# Patient Record
Sex: FEMALE | Race: AMERICAN INDIAN OR ALASKA NATIVE
[De-identification: names, ages, dates, MRNs, and addresses within clinical notes are randomized per-mention and may not be internally consistent; named-entity substitution may affect disease eponyms.]

---

## 2017-07-26 ENCOUNTER — HOSPITAL ENCOUNTER (EMERGENCY)
Dept: HOSPITAL 31 - C.ER | Age: 27
Discharge: HOME | End: 2017-07-26
Payer: MEDICAID

## 2017-07-26 VITALS — HEART RATE: 88 BPM | SYSTOLIC BLOOD PRESSURE: 128 MMHG | DIASTOLIC BLOOD PRESSURE: 76 MMHG

## 2017-07-26 VITALS — TEMPERATURE: 98.4 F | RESPIRATION RATE: 18 BRPM | OXYGEN SATURATION: 100 %

## 2017-07-26 DIAGNOSIS — M79.605: Primary | ICD-10-CM

## 2017-07-26 NOTE — C.PDOC
History Of Present Illness


26 yr old female presents to the ER with complaints of left leg pain for "months

". Patient states the pain is to her posterior upper leg. Patient denies seeing 

her PMD. Denies trauma, injury, back pain, foot pain, weakness or numbness. 


Time Seen by Provider: 07/26/17 10:12


Chief Complaint (Nursing): Lower Extremity Problem/Injury


History Per: Patient


History/Exam Limitations: no limitations


Onset/Duration Of Symptoms: Persistent ("Months")





Past Medical History


Reviewed: Historical Data, Nursing Documentation, Vital Signs


Vital Signs: 


 Last Vital Signs











Temp  98.4 F   07/26/17 10:05


 


Pulse  93 H  07/26/17 10:05


 


Resp  18   07/26/17 10:05


 


BP  134/77   07/26/17 10:05


 


Pulse Ox  100   07/26/17 11:10














- [facility] Procedures








REMOV INTRALUM EAR FB (11/22/13)








Family History: States: No Known Family Hx





- Social History


Hx Tobacco Use: No


Hx Alcohol Use: No


Hx Substance Use: No





- Immunization History


Hx Tetanus Toxoid Vaccination: No


Hx Influenza Vaccination: No


Hx Pneumococcal Vaccination: No





Review Of Systems


Except As Marked, All Systems Reviewed And Found Negative.


Musculoskeletal: Positive for: Leg Pain (Left ).  Negative for: Back Pain, Foot 

Pain


Neurological: Negative for: Weakness, Numbness





Physical Exam





- Physical Exam


Appears: Non-toxic, No Acute Distress


Skin: Warm, Dry, No Rash


Head: Atraumatic, Normacephalic


Oral Mucosa: Moist


Extremity: Normal ROM, No Tenderness, No Calf Tenderness, Capillary Refill (<2)

, No Swelling


Pulses: Left Dorsalis Pedis: Normal, Right Dorsalis Pedis: Normal


Neurological/Psych: Oriented x3, Normal Speech, Normal Motor





ED Course And Treatment


O2 Sat by Pulse Oximetry: 100 (RA )


Pulse Ox Interpretation: Normal





Medical Decision Making


Medical Decision Making: 


suspected msk pain vs sciatica, r/o dvt.





PLAN:


* Venous Duplex 


* Naproxen PO 


* 


* 


1100: pt well appearing in nad. dvt study neg. advise outptf/u and return 

precautions





Disposition





- Disposition


Disposition: HOME/ ROUTINE


Disposition Time: 11:03


Condition: STABLE


Additional Instructions: 


please follow up with your doctor. return to er with worsening symptoms or 

concerns. 


Prescriptions: 


Naproxen [Naprosyn] 500 mg PO BID PRN #14 tab


 PRN Reason: Pain, Mild (1-3)


Instructions:  Leg Pain (ED)


Forms:  [facility] (English)





- Clinical Impression


Clinical Impression: 


 Leg pain








- Scribe Statement


The provider has reviewed the documentation as recorded by the Christieibe


Viridiana Broderick


Provider Attestation: 


All medical record entries made by the Christieibhardik were at my direction and 

personally dictated by me. I have reviewed the chart and agree that the record 

accurately reflects my personal performance of the history, physical exam, 

medical decision making, and the department course for this patient. I have 

also personally directed, reviewed, and agree with the discharge instructions 

and disposition.

## 2017-07-26 NOTE — VASCLAB
PROCEDURE:  Left Lower Extremity Venous Duplex Exam. 



HISTORY:

leg pain r/o dvt 



PRIORS:

None. 



TECHNIQUE:

Left common femoral, femoral, popliteal and posterior tibial, 

peroneal and great saphenous veins were evaluated. Flow was assessed 

with color Doppler, compressibility, assessment of phasic flow and 

augmentation response.



Report prepared by   SAPPHIRE Diaz, RVT



FINDINGS:



LEFT:

1. Common Femoral Vein:



1.1.  Compressibility - Fully compressible: Thrombus - None : Flow - 

Phasic: Augmentation -Normal: Reflux - None.



2. Femoral Vein: 



2.1. Compressibility - Fully compressible: Thrombus -  None: Flow - 

Phasic: Augmentation -Normal: Reflux - None.



3. Popliteal Vein: 



3.1. Compressibility - Fully compressible: Thrombus -  None: Flow - 

Phasic: Augmentation -Normal: Reflux - None.



4. Posterior Tibial Vein: 



4.1. Compressibility - Fully compressible: Thrombus -  None: Flow - 

Phasic: Augmentation -Normal: Reflux - None.



5. Peroneal Vein: 



5.1. Compressibility - Fully compressible: Thrombus -  None: Flow - 

Phasic: Augmentation -Normal: Reflux - None.



6. Great Saphenous Vein:

6.1.  Compressibility - Fully compressible: Thrombus - None: Flow - 

Phasic: Augmentation - Normal: Reflux - None.





OTHER FINDINGS:  



IMPRESSION:

No evidence of deep or superficial vein thrombosis of the left lower 

extremity with excellent venous flow. Normal valve function noted of 

the left side.     



Normal venous flow noted in the right common femoral vein.

## 2018-01-16 ENCOUNTER — HOSPITAL ENCOUNTER (EMERGENCY)
Dept: HOSPITAL 31 - C.ER | Age: 28
LOS: 1 days | Discharge: HOME | End: 2018-01-17
Payer: MEDICAID

## 2018-01-16 VITALS — RESPIRATION RATE: 18 BRPM

## 2018-01-16 DIAGNOSIS — R10.9: Primary | ICD-10-CM

## 2018-01-16 LAB
ALBUMIN SERPL-MCNC: 4.1 G/DL (ref 3.5–5)
ALBUMIN/GLOB SERPL: 1.2 {RATIO} (ref 1–2.1)
ALT SERPL-CCNC: 30 U/L (ref 9–52)
AST SERPL-CCNC: 21 U/L (ref 14–36)
BASOPHILS # BLD AUTO: 0.1 K/UL (ref 0–0.2)
BASOPHILS NFR BLD: 0.8 % (ref 0–2)
BILIRUB UR-MCNC: NEGATIVE MG/DL
BUN SERPL-MCNC: 12 MG/DL (ref 7–17)
CALCIUM SERPL-MCNC: 8.7 MG/DL (ref 8.6–10.4)
EOSINOPHIL # BLD AUTO: 0.1 K/UL (ref 0–0.7)
EOSINOPHIL NFR BLD: 1.5 % (ref 0–4)
ERYTHROCYTE [DISTWIDTH] IN BLOOD BY AUTOMATED COUNT: 13.8 % (ref 11.5–14.5)
GFR NON-AFRICAN AMERICAN: > 60
GLUCOSE UR STRIP-MCNC: NORMAL MG/DL
HCG,QUALITATIVE URINE: NEGATIVE
HGB BLD-MCNC: 12.2 G/DL (ref 11–16)
LEUKOCYTE ESTERASE UR-ACNC: (no result) LEU/UL
LIPASE: 226 U/L (ref 23–300)
LYMPHOCYTES # BLD AUTO: 3.4 K/UL (ref 1–4.3)
LYMPHOCYTES NFR BLD AUTO: 42.7 % (ref 20–40)
MCH RBC QN AUTO: 24.9 PG (ref 27–31)
MCHC RBC AUTO-ENTMCNC: 32.7 G/DL (ref 33–37)
MCV RBC AUTO: 76.2 FL (ref 81–99)
MONOCYTES # BLD: 0.5 K/UL (ref 0–0.8)
MONOCYTES NFR BLD: 6 % (ref 0–10)
NEUTROPHILS # BLD: 3.9 K/UL (ref 1.8–7)
NEUTROPHILS NFR BLD AUTO: 49 % (ref 50–75)
NRBC BLD AUTO-RTO: 0.2 % (ref 0–2)
PH UR STRIP: 7 [PH] (ref 5–8)
PLATELET # BLD: 263 K/UL (ref 130–400)
PMV BLD AUTO: 8.3 FL (ref 7.2–11.7)
PROT UR STRIP-MCNC: NEGATIVE MG/DL
RBC # BLD AUTO: 4.88 MIL/UL (ref 3.8–5.2)
RBC # UR STRIP: (no result) /UL
SP GR UR STRIP: 1.01 (ref 1–1.03)
SQUAMOUS EPITHIAL: < 1 /HPF (ref 0–5)
URINE NITRATE: NEGATIVE
UROBILINOGEN UR-MCNC: NORMAL MG/DL (ref 0.2–1)
WBC # BLD AUTO: 8 K/UL (ref 4.8–10.8)

## 2018-01-16 PROCEDURE — 96374 THER/PROPH/DIAG INJ IV PUSH: CPT

## 2018-01-16 PROCEDURE — 74176 CT ABD & PELVIS W/O CONTRAST: CPT

## 2018-01-16 PROCEDURE — 80053 COMPREHEN METABOLIC PANEL: CPT

## 2018-01-16 PROCEDURE — 84703 CHORIONIC GONADOTROPIN ASSAY: CPT

## 2018-01-16 PROCEDURE — 83690 ASSAY OF LIPASE: CPT

## 2018-01-16 PROCEDURE — 81001 URINALYSIS AUTO W/SCOPE: CPT

## 2018-01-16 PROCEDURE — 99285 EMERGENCY DEPT VISIT HI MDM: CPT

## 2018-01-16 PROCEDURE — 96375 TX/PRO/DX INJ NEW DRUG ADDON: CPT

## 2018-01-16 PROCEDURE — 85025 COMPLETE CBC W/AUTO DIFF WBC: CPT

## 2018-01-16 NOTE — C.PDOC
History Of Present Illness


27 year old female presents to the ED c/o headache and also some abdominal pain 

that she describes more as discomfort. Patient reports she ate dinner that 

consisted of chicken and sweet potato and soon after the symptoms started. 

Patient denies fever, chills, nausea, vomit, diarrhea, vaginal bleeding, 

vaginal discharge, dysuria, hematuria. 


Time Seen by Provider: 01/16/18 20:16


Chief Complaint (Nursing): Abdominal Pain


History Per: Patient


History/Exam Limitations: no limitations


Onset/Duration Of Symptoms: Hrs


Current Symptoms Are (Timing): Still Present


Quality: "Pain"


Preceeding Symptoms: None


Recent travel outside of the [place]: No


Additional History Per: Patient





Past Medical History


Reviewed: Historical Data, Nursing Documentation, Vital Signs


Vital Signs: 


 Last Vital Signs











Temp  98 F   01/17/18 00:36


 


Pulse  82   01/17/18 00:36


 


Resp  18   01/17/18 00:36


 


BP  128/75   01/17/18 00:36


 


Pulse Ox  99   01/17/18 00:36














- Medical History


PMH: No Chronic Diseases


Surgical History: No Surg Hx





- [facility] Procedures








REMOV INTRALUM EAR FB (11/22/13)








Family History: States: Unknown Family Hx





- Social History


Hx Tobacco Use: No


Hx Alcohol Use: No


Hx Substance Use: No





- Immunization History


Hx Tetanus Toxoid Vaccination: No


Hx Influenza Vaccination: No


Hx Pneumococcal Vaccination: No





Review Of Systems


Constitutional: Negative for: Fever, Chills


Cardiovascular: Negative for: Chest Pain, Palpitations


Respiratory: Negative for: Cough, Shortness of Breath


Gastrointestinal: Positive for: Abdominal Pain.  Negative for: Nausea, Vomiting


Genitourinary: Negative for: Dysuria, Frequency, Hematuria


Skin: Negative for: Rash


Neurological: Positive for: Headache.  Negative for: Weakness, Numbness





Physical Exam





- Physical Exam


Appears: Non-toxic, No Acute Distress


Skin: Normal Color, Warm, Dry


Head: Atraumatic, Normacephalic


Eye(s): bilateral: Normal Inspection


Nose: No Discharge, No Deformity


Oral Mucosa: Moist


Neck: Normal ROM, Supple


Chest: Symmetrical


Cardiovascular: Rhythm Regular, No Murmur


Respiratory: Normal Breath Sounds, No Rales, No Rhonchi, No Wheezing


Gastrointestinal/Abdominal: Soft, Tenderness ((+) diffuse tenderness,(+) bs, no 

guarding, no rebound ), No Guarding, No Rebound


Extremity: Normal ROM, No Pedal Edema, No Calf Tenderness, No Deformity, No 

Swelling


Neurological/Psych: Oriented x3, Normal Speech, Normal Cognition


Gait: Steady





ED Course And Treatment





- Laboratory Results


Result Diagrams: 


 01/16/18 21:30





 01/16/18 21:30


O2 Sat by Pulse Oximetry: 100 (On RA)


Pulse Ox Interpretation: Normal





Medical Decision Making


Medical Decision Making: 


Impression : abdominal pain


Plan:


* Labs


* Pepcid 20 mg IVP


* IV fluids


* Toradol 30 mg IVP


* UA





Progress: 


On reassessment, patient reports ongoing abdominal pain. CT Abdomen/Pelvis 

ordered. 


On reassessment, patient is resting comfortably, showing no signs of distress 

and reports an improvement in her symptoms. CT Abd/Pelvis results are negative. 

Patient will be provided with a note for work, as requested. She is advised to 

follow up with her PMD within 1-2 days for further evaluation and/or return to 

the ED if symptoms persist or worsen. 





Disposition


Counseled Patient/Family Regarding: Studies Performed, Diagnosis, Need For 

Followup, Rx Given





- Disposition


Disposition: HOME/ ROUTINE


Disposition Time: 00:21


Condition: IMPROVED


Additional Instructions: 


follow up with your doctor in 2 days


call to make an appointment


take medications as needed


return to ER if symptoms worsens or progress 


Prescriptions: 


Famotidine [Pepcid] 20 mg PO BID #20 tab


Naproxen [Naprosyn] 500 mg PO BID PRN #10 tab


 PRN Reason: Pain, Moderate (4-7)


Ondansetron ODT [Zofran ODT] 4 mg PO TID PRN #12 odt


 PRN Reason: Nausea/Vomiting


Instructions:  Gas and Bloating (ED), Abdominal Pain (ED)


Forms:  [facility] (English), Work Excuse





- Clinical Impression


Clinical Impression: 


 Abdominal pain








- Scribe Statement


The provider has reviewed the documentation as recorded by the Scribe





Chaz Granda





All medical record entries made by the Scribe were at my direction and 

personally dictated by me. I have reviewed the chart and agree that the record 

accurately reflects my personal performance of the history, physical exam, 

medical decision making, and the department course for this patient. I have 

also personally directed, reviewed, and agree with the discharge instructions 

and disposition.

## 2018-01-17 VITALS — TEMPERATURE: 98 F | DIASTOLIC BLOOD PRESSURE: 75 MMHG | SYSTOLIC BLOOD PRESSURE: 128 MMHG | HEART RATE: 82 BPM

## 2018-01-17 VITALS — OXYGEN SATURATION: 100 %

## 2018-01-17 NOTE — CT
EXAM:

  CT Abdomen and Pelvis Without Intravenous Contrast



CLINICAL HISTORY:

  27 years old, female; Pain; Abdominal pain; Additional info: Abd. Pain



TECHNIQUE:

  Axial computed tomography images of the abdomen and pelvis without 

intravenous contrast.  All CT scans at this facility use one or more dose 

reduction techniques, viz.: automated exposure control; ma/kV adjustment per 

patient size (including targeted exams where dose is matched to indication; 

i.e. head); or iterative reconstruction technique.

  Coronal and sagittal reformatted images were created and reviewed.



COMPARISON:

  No relevant prior studies available.



FINDINGS:

  Limitations:  Lack of intravenous contrast.

  Lower thorax: No acute findings.



 ABDOMEN:

  Liver:  Unremarkable.

  Gallbladder and bile ducts:  No calcified stones.  No ductal dilation.

  Pancreas:  Unremarkable.  No ductal dilation.

  Spleen:  No splenomegaly.

  Adrenals:  No mass.

  Kidneys and ureters:  No renal calculi.  No hydronephrosis.

  Stomach and bowel:  No definite mural thickening.  No obstruction.

  Appendix:  Normal caliber.  No definite inflammation.



 PELVIS:

  Bladder:  Unremarkable.  No stones.

  Reproductive:  Unremarkable as visualized.



 ABDOMEN and PELVIS:

  Intraperitoneal space:  No significant fluid collection.  No free air.

  Bones/joints:  No acute fracture.

  Soft tissues:  Unremarkable.

  Vasculature:  Unremarkable.  No aneurysm.

  Lymph nodes:  No pathologically enlarged lymph nodes.



IMPRESSION:     

1.  No definite acute intraabdominal abnormality.

## 2018-12-30 ENCOUNTER — HOSPITAL ENCOUNTER (EMERGENCY)
Dept: HOSPITAL 42 - ED | Age: 28
Discharge: HOME | End: 2018-12-30
Payer: MEDICAID

## 2018-12-30 VITALS — HEART RATE: 77 BPM | OXYGEN SATURATION: 99 % | DIASTOLIC BLOOD PRESSURE: 72 MMHG | SYSTOLIC BLOOD PRESSURE: 118 MMHG

## 2018-12-30 VITALS — TEMPERATURE: 98.7 F | RESPIRATION RATE: 18 BRPM

## 2018-12-30 DIAGNOSIS — R10.31: ICD-10-CM

## 2018-12-30 DIAGNOSIS — B37.3: Primary | ICD-10-CM

## 2018-12-30 LAB
ALBUMIN SERPL-MCNC: 4.6 G/DL (ref 3–4.8)
ALBUMIN/GLOB SERPL: 1.3 {RATIO} (ref 1.1–1.8)
ALT SERPL-CCNC: 25 U/L (ref 7–56)
AMYLASE SERPL-CCNC: 108 U/L (ref 35–125)
APPEARANCE UR: CLEAR
APTT BLD: 30.2 SECONDS (ref 25.1–36.5)
AST SERPL-CCNC: 27 U/L (ref 14–36)
BACTERIA #/AREA URNS HPF: (no result) /HPF
BASOPHILS # BLD AUTO: 0.03 K/MM3 (ref 0–2)
BASOPHILS NFR BLD: 0.4 % (ref 0–3)
BILIRUB UR-MCNC: NEGATIVE MG/DL
BUN SERPL-MCNC: 12 MG/DL (ref 7–21)
CALCIUM SERPL-MCNC: 9.5 MG/DL (ref 8.4–10.5)
COLOR UR: YELLOW
EOSINOPHIL # BLD: 0.1 10*3/UL (ref 0–0.7)
EOSINOPHIL NFR BLD: 1.5 % (ref 1.5–5)
ERYTHROCYTE [DISTWIDTH] IN BLOOD BY AUTOMATED COUNT: 14.2 % (ref 11.5–14.5)
GFR NON-AFRICAN AMERICAN: > 60
GLUCOSE UR STRIP-MCNC: NEGATIVE MG/DL
GRANULOCYTES # BLD: 3.95 10*3/UL (ref 1.4–6.5)
GRANULOCYTES NFR BLD: 58.7 % (ref 50–68)
HGB BLD-MCNC: 13 G/DL (ref 12–16)
INR PPP: 1.09
LEUKOCYTE ESTERASE UR-ACNC: NEGATIVE LEU/UL
LIPASE SERPL-CCNC: 178 U/L (ref 23–300)
LYMPHOCYTES # BLD: 2.4 10*3/UL (ref 1.2–3.4)
LYMPHOCYTES NFR BLD AUTO: 35.2 % (ref 22–35)
MCH RBC QN AUTO: 25.1 PG (ref 25–35)
MCHC RBC AUTO-ENTMCNC: 32.8 G/DL (ref 31–37)
MCV RBC AUTO: 76.4 FL (ref 80–105)
MONOCYTES # BLD AUTO: 0.3 10*3/UL (ref 0.1–0.6)
MONOCYTES NFR BLD: 4.2 % (ref 1–6)
PH UR STRIP: 6 [PH] (ref 4.7–8)
PLATELET # BLD: 258 10^3/UL (ref 120–450)
PMV BLD AUTO: 9.6 FL (ref 7–11)
PROT UR STRIP-MCNC: NEGATIVE MG/DL
PROTHROMBIN TIME: 12.4 SECONDS (ref 9.4–12.5)
RBC # BLD AUTO: 5.18 10^6/UL (ref 3.5–6.1)
RBC # UR STRIP: (no result) /UL
RBC #/AREA URNS HPF: (no result) /HPF (ref 0–2)
SP GR UR STRIP: >= 1.03 (ref 1–1.03)
UROBILINOGEN UR STRIP-ACNC: 0.2 E.U./DL
WBC # BLD AUTO: 6.7 10^3/UL (ref 4.5–11)

## 2018-12-30 PROCEDURE — 96374 THER/PROPH/DIAG INJ IV PUSH: CPT

## 2018-12-30 PROCEDURE — 82150 ASSAY OF AMYLASE: CPT

## 2018-12-30 PROCEDURE — 74177 CT ABD & PELVIS W/CONTRAST: CPT

## 2018-12-30 PROCEDURE — 85610 PROTHROMBIN TIME: CPT

## 2018-12-30 PROCEDURE — 96361 HYDRATE IV INFUSION ADD-ON: CPT

## 2018-12-30 PROCEDURE — 87086 URINE CULTURE/COLONY COUNT: CPT

## 2018-12-30 PROCEDURE — 81001 URINALYSIS AUTO W/SCOPE: CPT

## 2018-12-30 PROCEDURE — 76830 TRANSVAGINAL US NON-OB: CPT

## 2018-12-30 PROCEDURE — 96375 TX/PRO/DX INJ NEW DRUG ADDON: CPT

## 2018-12-30 PROCEDURE — 85025 COMPLETE CBC W/AUTO DIFF WBC: CPT

## 2018-12-30 PROCEDURE — 80053 COMPREHEN METABOLIC PANEL: CPT

## 2018-12-30 PROCEDURE — 85730 THROMBOPLASTIN TIME PARTIAL: CPT

## 2018-12-30 PROCEDURE — 83690 ASSAY OF LIPASE: CPT

## 2018-12-30 PROCEDURE — 99283 EMERGENCY DEPT VISIT LOW MDM: CPT

## 2018-12-30 NOTE — US
Date of service: 



12/30/2018



HISTORY:

RLQ pain, r/o torsion



COMPARISON:

None available.



TECHNIQUE:

Transvaginal pelvic ultrasound was performed.



FINDINGS:



UTERUS:

Measures 7.9 x 5.7 x 3.7 cm.  Retroflexed, normal in size and 

appearance. No fibroid or other mass lesion seen.



ENDOMETRIUM:

Measures 11 mm in diameter. Unremarkable. 



CERVIX:

No cervical abnormality identified.



RIGHT OVARY:

Measures 3.8 x 1.7 x 3.3 cm. No solid mass. Normal flow. 



LEFT OVARY:

Measures 3.2 x 2.7 x 3.3 cm. No solid mass. Normal flow. 



FREE FLUID:

There is a small amount of free fluid in the cul de sac, likely 

physiologic.



OTHER FINDINGS:

None. 



IMPRESSION:

Unremarkable pelvic ultrasound.  No evidence of ovarian cyst or 

adnexal mass.  No evidence for torsion.

## 2018-12-30 NOTE — CT
Date of service: 



12/30/2018



PROCEDURE:  CT Abdomen and Pelvis with contrast



HISTORY:

RLQ pain



COMPARISON:

None.



TECHNIQUE:

Contrast dose: 100 mL Omnipaque 350



Radiation dose:



Total exam DLP = 403.59 mGy-cm.



This CT exam was performed using one or more of the following dose 

reduction techniques: Automated exposure control, adjustment of the 

mA and/or kV according to patient size, and/or use of iterative 

reconstruction technique.



FINDINGS:



LOWER THORAX:

Unremarkable. 



LIVER:

Unremarkable. No gross lesion or ductal dilatation. 



GALLBLADDER AND BILE DUCTS:

Unremarkable. 



PANCREAS:

Unremarkable. No gross lesion or ductal dilatation.



SPLEEN:

Unremarkable. 



ADRENALS:

Unremarkable. No mass. 



KIDNEYS AND URETERS:

Unremarkable. No hydronephrosis. No solid mass. 



VASCULATURE:

Unremarkable. No aortic aneurysm. No aortic atherosclerotic 

calcification or mural plaque present.



BOWEL:

Unremarkable. No obstruction. No gross mural thickening. 



APPENDIX:

Normal appendix. 



PERITONEUM:

Unremarkable. No free fluid. No free air. 



LYMPH NODES:

Unremarkable. No enlarged lymph nodes. 



BLADDER:

Unremarkable. 



REPRODUCTIVE:

Slightly increased prominence of the right adnexa relative to the 

left 



BONES:

No acute fracture. 



OTHER FINDINGS:

None.



IMPRESSION:

No evidence of appendicitis.



Slightly increased prominence of the right adnexa relative to the 

left.  Given right lower quadrant pain, pelvic ultrasound can be 

considered to evaluate for ovarian torsion as clinically needed.

## 2018-12-30 NOTE — ED PDOC
Arrival/HPI





- General


Chief Complaint: GI Problem


Time Seen by Provider: 12/30/18 14:35


Historian: Patient





- History of Present Illness


Narrative History of Present Illness (Text): 





12/31/18 00:19


28 year old female with no significant past medical history presents to the 

emergency department complaining of right sided flank pain x one day. Patient 

describes gradually worsening intermittent sharp right lower quadrant pain that 

radiates around the right flank to the back. Tolerating PO and having BM per 

baseline. States she typically has pain like this with her UTIs. Unknown LMP, 

patient on DepoProvera shot. Denies fever, chills, urinary symptoms, stool 

changes, N/V, vaginal bleeding, vaginal discharge, pelvic pain, rash, or any 

other associated symptoms.





Past Medical History





- Provider Review


Nursing Documentation Reviewed: Yes





- Infectious Disease


Hx of Infectious Diseases: None





- Psychiatric


Hx Substance Use: No





- Anesthesia


Hx Anesthesia: No





- Suicidal Assessment


Feels Threatened In Home Enviroment: No





Family/Social History





- Physician Review


Nursing Documentation Reviewed: Yes


Family/Social History: No Known Family HX


Smoking Status: Never Smoked


Hx Alcohol Use: No


Hx Substance Use: No





Allergies/Home Meds


Allergies/Adverse Reactions: 


Allergies





No Known Allergies Allergy (Verified 12/30/18 14:33)


   








Home Medications: 


                                    Home Meds











 Medication  Instructions  Recorded  Confirmed


 


Depo-Provera Contraceptive 1 mg IM Q3M 10/08/15 12/30/18














Review of Systems





- Physician Review


All systems were reviewed & negative as marked: Yes





- Review of Systems


Constitutional: Normal.  absent: Fevers


Eyes: Normal


ENT: Normal.  absent: Sinus Congestion


Respiratory: Normal.  absent: SOB, Cough


Cardiovascular: Normal.  absent: Chest Pain, Palpitations, Syncope


Gastrointestinal: Abdominal Pain.  absent: Stool Changes, Nausea, Vomiting, 

Appetite Changes, Hematochezia, Hematemesis


Genitourinary Female: Normal.  absent: Dysuria, Frequency, Vaginal Bleeding, 

Vaginal Discharge


Musculoskeletal: Back Pain


Skin: Normal.  absent: Rash


Neurological: Normal.  absent: Headache, Dizziness, Gait Changes, Disequilibrium


Endocrine: Normal


Hemo/Lymphatic: Normal


Psychiatric: Normal





Physical Exam


Vital Signs Reviewed: Yes





Vital Signs











  Temp Pulse Resp BP Pulse Ox


 


 12/30/18 14:31  98.7 F  88  18  120/78  98











Temperature: Afebrile


Blood Pressure: Normal


Pulse: Regular


Respiratory Rate: Normal


Appearance: Positive for: Well-Appearing, Non-Toxic, Comfortable


Pain Distress: None


Mental Status: Positive for: Alert and Oriented X 3





- Systems Exam


Head: Present: Atraumatic, Normocephalic


Pupils: Present: PERRL


Extroacular Muscles: Present: EOMI


Conjunctiva: Present: Normal


Mouth: Present: Moist Mucous Membranes


Pharnyx: Present: Normal.  No: ERYTHEMA, EXUDATE


Nose (External): Present: Atraumatic


Nose (Internal): Present: Normal Inspection


Neck: Present: Normal Range of Motion.  No: Meningeal Signs


Respiratory/Chest: Present: Clear to Auscultation, Good Air Exchange.  No: 

Respiratory Distress, Accessory Muscle Use


Cardiovascular: Present: Regular Rate and Rhythm, Normal S1, S2, Peripheal 

Pulses Present.  No: Murmurs


Abdomen: Present: Tenderness (RLQ), Normal Bowel Sounds.  No: Distention, Annamaria

toneal Signs, Rebound, Guarding


Genitourinary/Pelvic Exam: Present: Normal External Genitalia, Vaginal Discharge

(thick white curd-like discharge suspicious of candidal vaginitis), Cervical os 

Closed.  No: Vaginal Bleeding, Vaginal Lesions, Adenexal Tenderness, Adenexal 

Mass, Cervical Motion Tendernes, Odor


Back: Present: Normal Inspection.  No: CVA Tenderness


Upper Extremity: Present: Normal Inspection, Normal ROM, NORMAL PULSES, 

Neurovascularly Intact, Capillary Refill < 2s.  No: Cyanosis, Edema


Lower Extremity: Present: Normal Inspection, NORMAL PULSES, Normal ROM, 

Neurovascularly Intact, Capillary Refill < 2 s.  No: Edema


Neurological: Present: GCS=15, CN II-XII Intact, Speech Normal, Motor Func 

Grossly Intact, Normal Sensory Function, Gait Normal


Skin: Present: Warm, Dry, Normal Color.  No: Rashes


Lymphatic: No: Cervical Adenopathy


Psychiatric: Present: Alert, Oriented x 3, Normal Insight, Normal Concentration,

Normal Affect, Normal Mood





Medical Decision Making


ED Course and Treatment: 


Initial Plan:


* CBC, CMP


* Lipase, Amylase


* Coags


* UA, culture


* CT Abd/Pelvis





Labwork significant for mild hypokalemia at 3.5, will give 20mEq KCl. Otherwise 

unremarkable


UA with trace blood





16:50


CT Abd/Pelvis shows prominent right ovary, recommends transvaginal ultrasound. 

Will get transvaginal ultrasound to rule out ovarian torsion.





18:00


Transvaginal ultrasound negative for ovarian torsion.


Patient still with mild abdominal discomfort.


Pelvic exam revealed discharge suspicious for candidal infection, will give 

Diflucan. 


Will treat for UTI with Keflex secondary to patient's typical symptoms and trace

blood in urine.





Diagnostic testing results and plan of care discussed with patient, and strict 

instructions given regarding prescriptions, importance of follow up, and signs 

to return to Emergency Department, to include worsening abdominal pain, N/V, 

fever, chills, or any other new/worsening symptoms. Patient verbalizes 

understanding of discussion.  Patient A&Ox3, ambulating with steady gait, stable

for discharge home.











- Lab Interpretations


Lab Results: 











                                 12/30/18 15:05 





                                 12/30/18 15:05 





                                   Lab Results





12/30/18 15:19: Urine Color Yellow, Urine Appearance Clear, Urine pH 6.0, Ur 

Specific Gravity >= 1.030, Urine Protein Negative, Urine Glucose (UA) Negative, 

Urine Ketones Negative, Urine Blood Trace-intact H, Urine Nitrate Negative, 

Urine Bilirubin Negative, Urine Urobilinogen 0.2, Ur Leukocyte Esterase 

Negative, Urine RBC 0 - 2, Urine WBC 1 - 3, Ur Epithelial Cells 3 - 4, Urine 

Bacteria Small


12/30/18 15:05: Sodium 143, Potassium 3.4 L, Chloride 109 H, Carbon Dioxide 25, 

Anion Gap 12, BUN 12, Creatinine 0.8, Est GFR (African Amer) > 60, Est GFR (Non-

Af Amer) > 60, Random Glucose 92, Calcium 9.5, Total Bilirubin 0.8, AST 27, ALT 

25, Alkaline Phosphatase 79, Total Protein 8.1, Albumin 4.6, Globulin 3.6, 

Albumin/Globulin Ratio 1.3, Amylase 108, Lipase 178


12/30/18 15:05: PT 12.4, INR 1.09, APTT 30.2


12/30/18 15:05: WBC 6.7, RBC 5.18, Hgb 13.0, Hct 39.6, MCV 76.4 L, MCH 25.1, 

MCHC 32.8, RDW 14.2, Plt Count 258, MPV 9.6, Gran % 58.7, Lymph % (Auto) 35.2 H,

Mono % (Auto) 4.2, Eos % (Auto) 1.5, Baso % (Auto) 0.4, Gran # 3.95, Lymph # 

(Auto) 2.4, Mono # (Auto) 0.3, Eos # (Auto) 0.1, Baso # (Auto) 0.03








I have reviewed the lab results: Yes


Interpretation: All labs normal





- RAD Interpretation


Narrative RAD Interpretations (Text): 





CT Abd/Pelvis with IV contrast:


FINDINGS:


LOWER THORAX:


Unremarkable. 


LIVER:


Unremarkable. No gross lesion or ductal dilatation. 


GALLBLADDER AND BILE DUCTS:


Unremarkable. 


PANCREAS:


Unremarkable. No gross lesion or ductal dilatation.


SPLEEN:


Unremarkable. 


ADRENALS:


Unremarkable. No mass. 


KIDNEYS AND URETERS:


Unremarkable. No hydronephrosis. No solid mass. 


VASCULATURE:


Unremarkable. No aortic aneurysm. No aortic atherosclerotic calcification or 

mural plaque present.


BOWEL:


Unremarkable. No obstruction. No gross mural thickening. 


APPENDIX:


Normal appendix. 


PERITONEUM:


Unremarkable. No free fluid. No free air. 


LYMPH NODES:


Unremarkable. No enlarged lymph nodes. 


BLADDER:


Unremarkable. 


REPRODUCTIVE:


Slightly increased prominence of the right adnexa relative to the left 


BONES:


No acute fracture. 


OTHER FINDINGS:


None.


IMPRESSION:


No evidence of appendicitis.


Slightly increased prominence of the right adnexa relative to the left.  Given 

right lower quadrant pain, pelvic ultrasound can be considered to evaluate for 

ovarian torsion as clinically needed.





12/30/18 17:55


Transvaginal Ultrasound:


FINDINGS:


UTERUS:


Measures 7.9 x 5.7 x 3.7 cm.  Retroflexed, normal in size and appearance. No 

fibroid or other mass lesion seen.


ENDOMETRIUM:


Measures 11 mm in diameter. Unremarkable. 


CERVIX:


No cervical abnormality identified.


RIGHT OVARY:


Measures 3.8 x 1.7 x 3.3 cm. No solid mass. Normal flow. 


LEFT OVARY:


Measures 3.2 x 2.7 x 3.3 cm. No solid mass. Normal flow. 


FREE FLUID:


There is a small amount of free fluid in the cul de sac, likely physiologic.


OTHER FINDINGS:


None. 


IMPRESSION:


Unremarkable pelvic ultrasound.  No evidence of ovarian cyst or adnexal mass.  

No evidence for torsion.





Radiology Orders: 











12/30/18 15:00


ABD & PELVIS IV CONTRAST ONLY [CT] Stat 





12/30/18 16:53


TRANSVAGINAL [US] Stat 











: Radiologist





- Medication Orders


Current Medication Orders: 














Discontinued Medications





Famotidine (Pepcid)  20 mg IVP STAT STA


   Stop: 12/30/18 14:55


   Last Admin: 12/30/18 15:39  Dose: 20 mg





IVP Administration


 Document     12/30/18 15:39  MOHINI  (Rec: 12/30/18 15:39  [id number])


     Charges for Administration


      # of IVP Administrations                   1





Sodium Chloride (Sodium Chloride 0.9%)  1,000 mls @ 999 mls/hr IV .Q1H1M STA


   Stop: 12/30/18 15:57


   Last Admin: 12/30/18 15:20  Dose: 999 mls/hr





eMAR Start Stop


 Document     12/30/18 15:20  MOHINI  (Rec: 12/30/18 15:38  [id number])


     Intravenous Solution


      Start Date                                 12/30/18


      Start Time                                 15:20


      End Date                                   12/30/18


      End time                                   16:20


      Total Infusion Time                        60





Ketorolac Tromethamine (Toradol)  30 mg IVP STAT STA


   Stop: 12/30/18 14:55


   Last Admin: 12/30/18 15:38  Dose: 30 mg





MAR Pain Assessment


 Document     12/30/18 15:38  MOHINI  (Rec: 12/30/18 15:38  [id number])


     Pain Reassessment


      Is this a pain reassessment?               No


     Sleep


      Is patient sleeping during reassessment?   No


     Presence of Pain


      Presence of Pain                           Yes


IVP Administration


 Document     12/30/18 15:38  KATELYN  (Rec: 12/30/18 15:38  [id number])


     Charges for Administration


      # of IVP Administrations                   1





Potassium Chloride (K-Dur 20 Meq Er Tab)  20 meq PO STAT STA


   Stop: 12/30/18 15:58


   Last Admin: 12/30/18 16:36  Dose: 20 meq











Disposition/Present on Arrival





- Present on Arrival


Any Indicators Present on Arrival: No


History of DVT/PE: No


History of Uncontrolled Diabetes: No


Urinary Catheter: No


History of Decub. Ulcer: No


History Surgical Site Infection Following: None





- Disposition


Have Diagnosis and Disposition been Completed?: Yes


Diagnosis: 


 Abdominal pain, Vaginal candidiasis





Disposition: HOME/ ROUTINE


Disposition Time: 18:25


Condition: IMPROVED


Discharge Instructions (ExitCare):  Acute Abdomen (Belly Pain), Adult (DC), 

Flank Pain (DC)


Additional Instructions: 


Increase fluids


Ibuprofen/tylenol for pain


Keflex every 12 hours for 7 days


Followup with gynecologist within 2 days


Followup with primary doctor within 2 days


Return to ER with any new/worsening symptoms


Prescriptions: 


Cephalexin [Keflex] 500 mg PO Q12H 7 Days #14 capsule


Referrals: 


Venkat Ibrahim MD [Primary Care Provider] - Follow up with primary


Michael Castro [Medical Doctor] - Follow up with primary


Forms:  [facility] (English), WORK NOTE